# Patient Record
Sex: FEMALE | Race: WHITE | NOT HISPANIC OR LATINO | Employment: OTHER | URBAN - NONMETROPOLITAN AREA
[De-identification: names, ages, dates, MRNs, and addresses within clinical notes are randomized per-mention and may not be internally consistent; named-entity substitution may affect disease eponyms.]

---

## 2020-05-12 ENCOUNTER — OFFICE VISIT (OUTPATIENT)
Dept: CARDIOLOGY | Facility: CLINIC | Age: 65
End: 2020-05-12

## 2020-05-12 VITALS
WEIGHT: 180 LBS | DIASTOLIC BLOOD PRESSURE: 78 MMHG | SYSTOLIC BLOOD PRESSURE: 142 MMHG | HEIGHT: 65 IN | OXYGEN SATURATION: 93 % | HEART RATE: 118 BPM | TEMPERATURE: 98.6 F | BODY MASS INDEX: 29.99 KG/M2

## 2020-05-12 DIAGNOSIS — R60.0 EDEMA LEG: Primary | ICD-10-CM

## 2020-05-12 DIAGNOSIS — R06.02 SHORTNESS OF BREATH: ICD-10-CM

## 2020-05-12 DIAGNOSIS — R00.2 PALPITATIONS: ICD-10-CM

## 2020-05-12 PROCEDURE — 99204 OFFICE O/P NEW MOD 45 MIN: CPT | Performed by: PHYSICIAN ASSISTANT

## 2020-05-12 PROCEDURE — 93000 ELECTROCARDIOGRAM COMPLETE: CPT | Performed by: PHYSICIAN ASSISTANT

## 2020-05-12 RX ORDER — QUETIAPINE FUMARATE 100 MG/1
100 TABLET, FILM COATED ORAL
COMMUNITY
Start: 2020-04-30

## 2020-05-12 RX ORDER — FUROSEMIDE 40 MG/1
40 TABLET ORAL 2 TIMES DAILY
Qty: 60 TABLET | Refills: 5 | Status: SHIPPED | OUTPATIENT
Start: 2020-05-12

## 2020-05-12 RX ORDER — POTASSIUM CHLORIDE 750 MG/1
10 TABLET, FILM COATED, EXTENDED RELEASE ORAL 2 TIMES DAILY
Qty: 60 TABLET | Refills: 11 | Status: SHIPPED | OUTPATIENT
Start: 2020-05-12

## 2020-05-12 RX ORDER — POTASSIUM CHLORIDE 750 MG/1
10 TABLET, FILM COATED, EXTENDED RELEASE ORAL DAILY
Qty: 30 TABLET | Refills: 11 | Status: SHIPPED | OUTPATIENT
Start: 2020-05-12 | End: 2020-05-12 | Stop reason: SDUPTHER

## 2020-05-12 RX ORDER — VALSARTAN 160 MG/1
160 TABLET ORAL 2 TIMES DAILY
Qty: 60 TABLET | Refills: 11 | Status: SHIPPED | OUTPATIENT
Start: 2020-05-12

## 2020-05-12 RX ORDER — AMLODIPINE BESYLATE 5 MG/1
5 TABLET ORAL DAILY
COMMUNITY
Start: 2020-04-22 | End: 2020-05-12

## 2020-05-12 RX ORDER — ALBUTEROL SULFATE 90 UG/1
1 AEROSOL, METERED RESPIRATORY (INHALATION) AS NEEDED
COMMUNITY
Start: 2020-04-25

## 2020-05-12 RX ORDER — GABAPENTIN 800 MG/1
800 TABLET ORAL 2 TIMES DAILY
COMMUNITY
Start: 2020-05-07

## 2020-05-12 RX ORDER — BUPRENORPHINE HYDROCHLORIDE AND NALOXONE HYDROCHLORIDE DIHYDRATE 8; 2 MG/1; MG/1
1 TABLET SUBLINGUAL 2 TIMES DAILY
COMMUNITY
Start: 2020-05-11

## 2020-05-12 RX ORDER — SERTRALINE HYDROCHLORIDE 100 MG/1
100 TABLET, FILM COATED ORAL DAILY
COMMUNITY
Start: 2020-05-05

## 2020-05-12 RX ORDER — FUROSEMIDE 20 MG/1
20 TABLET ORAL DAILY
COMMUNITY
Start: 2020-05-03 | End: 2020-05-12 | Stop reason: SDUPTHER

## 2020-05-12 NOTE — PROGRESS NOTES
Subjective   Radha Diaz is a 64 y.o. female     Chief Complaint   Patient presents with   • Edema     x 4 months referred by Luz GALLARDO    Patient is a 64-year-old female that presents to the office for evaluation.  Patient has no history of coronary artery disease.    Patient has had lower extremity edema for quite some time.  She describes that she recently began noticing it is worsening bilaterally.  She had significant lower extremity edema and was prescribed Lasix by her primary.  This was increased.  Patient in fact had been taking 80 mg of Lasix.  She describes taking 4 this morning and is having to urinate frequently now.  She is not sure what is caused her bilateral lower extremity edema but is concerned that it has steadily progressed.    She does not have any chest discomfort but does have fatigue And dyspnea.  She has chronic lung disease and is on oxygen continuously with inhalers.  She does not describe PND orthopnea.    She does not palpitate or have dysrhythmic symptoms.  She otherwise is doing well    Current Outpatient Medications   Medication Sig Dispense Refill   • albuterol sulfate  (90 Base) MCG/ACT inhaler Inhale 1 puff As Needed.     • buprenorphine-naloxone (SUBOXONE) 8-2 MG per SL tablet 1 tablet 2 (Two) Times a Day.     • furosemide (LASIX) 40 MG tablet Take 1 tablet by mouth 2 (Two) Times a Day. 60 tablet 5   • gabapentin (NEURONTIN) 800 MG tablet Take 800 mg by mouth 2 (Two) Times a Day.     • O2 (OXYGEN) Inhale 4 L/min 1 (One) Time.     • QUEtiapine (SEROquel) 100 MG tablet Take 100 mg by mouth every night at bedtime.     • sertraline (ZOLOFT) 100 MG tablet Take 100 mg by mouth Daily.     • TRELEGY ELLIPTA 100-62.5-25 MCG/INH aerosol powder  1 puff Daily.     • BREO ELLIPTA 100-25 MCG/INH inhaler      • potassium chloride (K-DUR) 10 MEQ CR tablet Take 1 tablet by mouth 2 (Two) Times a Day. 60 tablet 11   • valsartan (DIOVAN) 160 MG tablet Take 1 tablet by  mouth 2 (Two) Times a Day. 60 tablet 11     No current facility-administered medications for this visit.        Sulfa antibiotics    Past Medical History:   Diagnosis Date   • COPD (chronic obstructive pulmonary disease) (CMS/[facility])    • Depression    • Hypertension        Social History     Socioeconomic History   • Marital status: Legally      Spouse name: Not on file   • Number of children: Not on file   • Years of education: Not on file   • Highest education level: Not on file   Tobacco Use   • Smoking status: Current Every Day Smoker     Packs/day: 0.50     Years: 40.00     Pack years: 20.00     Types: Cigarettes   • Smokeless tobacco: Never Used   Substance and Sexual Activity   • Alcohol use: Not Currently   • Drug use: Yes     Comment: Perocet   • Sexual activity: Defer       Family History   Problem Relation Age of Onset   • No Known Problems Mother    • Heart disease Father    • Coronary artery disease Father    • Heart disease Sister    • Coronary artery disease Sister        Review of Systems   Constitutional: Positive for fatigue. Negative for chills and fever.   HENT: Positive for congestion. Negative for rhinorrhea and sinus pressure.    Eyes: Positive for visual disturbance (glasses).   Respiratory: Positive for shortness of breath (COPD, uses oxygen). Negative for chest tightness.    Cardiovascular: Positive for leg swelling. Negative for chest pain and palpitations (at times).   Gastrointestinal: Negative.  Negative for abdominal pain, blood in stool, constipation, diarrhea, nausea and vomiting.   Endocrine: Negative.  Negative for cold intolerance and heat intolerance.   Genitourinary: Positive for urgency. Negative for dysuria, frequency and hematuria.   Musculoskeletal: Positive for back pain (chronic). Negative for arthralgias and neck pain.   Skin: Negative.  Negative for rash and wound.   Allergic/Immunologic: Negative.  Negative for environmental allergies and food allergies.    "  Neurological: Negative.  Negative for dizziness, syncope and light-headedness.   Hematological: Does not bruise/bleed easily.   Psychiatric/Behavioral: Positive for sleep disturbance (oxygen at hs, denies waking with soa or cp). Negative for agitation. The patient is not nervous/anxious.    All other systems reviewed and are negative.      Objective   Vitals:    05/12/20 0955   BP: 142/78   Pulse: 118   Temp: 98.6 °F (37 °C)   SpO2: 93%   Weight: 81.6 kg (180 lb)   Height: 165.1 cm (65\")      /78   Pulse 118   Temp 98.6 °F (37 °C)   Ht 165.1 cm (65\")   Wt 81.6 kg (180 lb)   SpO2 93% Comment: O2@4LPM pulsate  BMI 29.95 kg/m²     Lab Results (most recent)     None          Physical Exam   Constitutional: She is oriented to person, place, and time. She appears well-developed and well-nourished. No distress.   HENT:   Head: Normocephalic and atraumatic.   Eyes: Conjunctivae are normal. Right eye exhibits no discharge. Left eye exhibits no discharge. No scleral icterus.   Neck: No JVD present.   Cardiovascular: Normal rate, regular rhythm and normal heart sounds. Exam reveals no gallop and no friction rub.   No murmur heard.  Pulmonary/Chest: Effort normal and breath sounds normal. No respiratory distress. She has no wheezes. She has no rales. She exhibits no tenderness.   Musculoskeletal: Normal range of motion. She exhibits edema.   Neurological: She is alert and oriented to person, place, and time. No cranial nerve deficit.   Skin: Skin is warm and dry. No rash noted. No erythema. No pallor.   Psychiatric: She has a normal mood and affect. Her behavior is normal.   Nursing note and vitals reviewed.      Procedure     ECG 12 Lead  Date/Time: 5/12/2020 10:05 AM  Performed by: Pepito James PA  Authorized by: Pepito James PA   Comparison: not compared with previous ECG   Comments: EKG demonstrates sinus rhythm at 95 bpm with no acute ST changes                 Assessment/Plan     Problems " Addressed this Visit        Cardiovascular and Mediastinum    Palpitations    Relevant Orders    ECG 12 Lead    Adult Transthoracic Echo Complete W/ Cont if Necessary Per Protocol    Stress Test With Myocardial Perfusion One Day       Respiratory    Shortness of breath    Relevant Orders    Stress Test With Myocardial Perfusion One Day       Other    Edema leg - Primary    Relevant Orders    ECG 12 Lead    Adult Transthoracic Echo Complete W/ Cont if Necessary Per Protocol    Stress Test With Myocardial Perfusion One Day            Recommendation  1.  Patient with shortness of breath, lower extremity edema and a degree of fatigue and weakness.  I am concerned that she is requiring such high doses of Lasix to treat her lower extremity edema.  I do think cardiac evaluation is warranted    2.  Echocardiogram to evaluate LV structure, LV function, assess diastolic performance and rule out valvular disease    3.  She is taking Lasix 80 mg.  She had labs performed by primary which I like to review.  I am adding a small potassium supplement.  I would like to recheck laboratories  4.  I am stopping amlodipine.  Office today with peripheral vasodilatation, this may contribute some to her edema.  I would rather to switch that to valsartan which can hopefully help with blood pressure control and we can increase it to twice daily dosing if needed    5.  I like to see her back for follow-up after testing in a few weeks.  She will follow with primary as scheduled         Radha Diaz  reports that she has been smoking cigarettes. She has a 20.00 pack-year smoking history. She has never used smokeless tobacco.. I have educated her on the risk of diseases from using tobacco products such as cancer, COPD and heart diease.     I advised her to quit and she is willing to quit. We have discussed the following method/s for tobacco cessation:  Counseling.    I spent 3  minutes counseling the patient.          Patient's Body mass index  is 29.95 kg/m². BMI is above normal parameters. Recommendations include: educational material.         Electronically signed by:

## 2020-05-20 ENCOUNTER — TELEPHONE (OUTPATIENT)
Dept: CARDIOLOGY | Facility: CLINIC | Age: 65
End: 2020-05-20

## 2020-05-20 DIAGNOSIS — R06.02 SHORTNESS OF BREATH: ICD-10-CM

## 2020-05-20 DIAGNOSIS — R00.2 PALPITATIONS: Primary | ICD-10-CM

## 2020-05-20 DIAGNOSIS — R60.0 EDEMA LEG: ICD-10-CM

## 2020-06-03 ENCOUNTER — APPOINTMENT (OUTPATIENT)
Dept: CARDIOLOGY | Facility: HOSPITAL | Age: 65
End: 2020-06-03

## 2020-06-03 ENCOUNTER — LAB (OUTPATIENT)
Dept: LAB | Facility: HOSPITAL | Age: 65
End: 2020-06-03

## 2020-06-03 ENCOUNTER — HOSPITAL ENCOUNTER (OUTPATIENT)
Dept: CARDIOLOGY | Facility: HOSPITAL | Age: 65
End: 2020-06-03

## 2020-06-03 ENCOUNTER — HOSPITAL ENCOUNTER (OUTPATIENT)
Dept: CARDIOLOGY | Facility: HOSPITAL | Age: 65
Discharge: HOME | End: 2020-06-03
Admitting: PHYSICIAN ASSISTANT

## 2020-06-03 VITALS — HEIGHT: 65 IN | WEIGHT: 179.9 LBS | BODY MASS INDEX: 29.97 KG/M2

## 2020-06-03 DIAGNOSIS — R60.0 EDEMA LEG: ICD-10-CM

## 2020-06-03 DIAGNOSIS — R00.2 PALPITATIONS: ICD-10-CM

## 2020-06-03 DIAGNOSIS — R06.02 SHORTNESS OF BREATH: ICD-10-CM

## 2020-06-03 LAB
ANION GAP SERPL CALCULATED.3IONS-SCNC: 10.7 MMOL/L (ref 5–15)
BUN BLD-MCNC: 8 MG/DL (ref 8–23)
BUN/CREAT SERPL: 10.7 (ref 7–25)
CALCIUM SPEC-SCNC: 9.4 MG/DL (ref 8.6–10.5)
CHLORIDE SERPL-SCNC: 100 MMOL/L (ref 98–107)
CO2 SERPL-SCNC: 29.3 MMOL/L (ref 22–29)
CREAT BLD-MCNC: 0.75 MG/DL (ref 0.57–1)
GFR SERPL CREATININE-BSD FRML MDRD: 78 ML/MIN/1.73
GLUCOSE BLD-MCNC: 115 MG/DL (ref 65–99)
POTASSIUM BLD-SCNC: 3.7 MMOL/L (ref 3.5–5.2)
SODIUM BLD-SCNC: 140 MMOL/L (ref 136–145)

## 2020-06-03 PROCEDURE — 80048 BASIC METABOLIC PNL TOTAL CA: CPT | Performed by: PHYSICIAN ASSISTANT

## 2020-06-03 PROCEDURE — 93306 TTE W/DOPPLER COMPLETE: CPT

## 2020-06-03 PROCEDURE — 93306 TTE W/DOPPLER COMPLETE: CPT | Performed by: INTERNAL MEDICINE

## 2020-06-03 PROCEDURE — 36415 COLL VENOUS BLD VENIPUNCTURE: CPT

## 2020-06-10 ENCOUNTER — HOSPITAL ENCOUNTER (OUTPATIENT)
Dept: CARDIOLOGY | Facility: HOSPITAL | Age: 65
Discharge: HOME | End: 2020-06-10

## 2020-06-10 DIAGNOSIS — R00.2 PALPITATIONS: ICD-10-CM

## 2020-06-10 DIAGNOSIS — R60.0 EDEMA LEG: ICD-10-CM

## 2020-06-10 DIAGNOSIS — R06.02 SHORTNESS OF BREATH: ICD-10-CM

## 2020-06-10 LAB
AORTIC DIMENSIONLESS INDEX: 0.9 (DI)
BH CV ECHO MEAS - ACS: 1.5 CM
BH CV ECHO MEAS - AO MAX PG (FULL): 2.2 MMHG
BH CV ECHO MEAS - AO MAX PG: 8.1 MMHG
BH CV ECHO MEAS - AO MEAN PG (FULL): 2 MMHG
BH CV ECHO MEAS - AO MEAN PG: 4 MMHG
BH CV ECHO MEAS - AO ROOT AREA (BSA CORRECTED): 1.7
BH CV ECHO MEAS - AO ROOT AREA: 8.6 CM^2
BH CV ECHO MEAS - AO ROOT DIAM: 3.3 CM
BH CV ECHO MEAS - AO V2 MAX: 142 CM/SEC
BH CV ECHO MEAS - AO V2 MEAN: 88.8 CM/SEC
BH CV ECHO MEAS - AO V2 VTI: 30.9 CM
BH CV ECHO MEAS - BSA(HAYCOCK): 2 M^2
BH CV ECHO MEAS - BSA: 1.9 M^2
BH CV ECHO MEAS - BZI_BMI: 30 KILOGRAMS/M^2
BH CV ECHO MEAS - BZI_METRIC_HEIGHT: 165.1 CM
BH CV ECHO MEAS - BZI_METRIC_WEIGHT: 81.6 KG
BH CV ECHO MEAS - EDV(CUBED): 111.3 ML
BH CV ECHO MEAS - EDV(TEICH): 108 ML
BH CV ECHO MEAS - EF(CUBED): 68.6 %
BH CV ECHO MEAS - EF(TEICH): 60 %
BH CV ECHO MEAS - ESV(CUBED): 35 ML
BH CV ECHO MEAS - ESV(TEICH): 43.2 ML
BH CV ECHO MEAS - FS: 32 %
BH CV ECHO MEAS - IVS/LVPW: 1.1
BH CV ECHO MEAS - IVSD: 1.2 CM
BH CV ECHO MEAS - LA DIMENSION: 3.8 CM
BH CV ECHO MEAS - LA/AO: 1.2
BH CV ECHO MEAS - LAT PEAK E' VEL: 14.1 CM/SEC
BH CV ECHO MEAS - LV IVRT: 0.1 SEC
BH CV ECHO MEAS - LV MASS(C)D: 198.3 GRAMS
BH CV ECHO MEAS - LV MASS(C)DI: 104.8 GRAMS/M^2
BH CV ECHO MEAS - LV MAX PG: 5.9 MMHG
BH CV ECHO MEAS - LV MEAN PG: 2 MMHG
BH CV ECHO MEAS - LV V1 MAX: 121 CM/SEC
BH CV ECHO MEAS - LV V1 MEAN: 71.8 CM/SEC
BH CV ECHO MEAS - LV V1 VTI: 28.3 CM
BH CV ECHO MEAS - LVIDD: 4.8 CM
BH CV ECHO MEAS - LVIDS: 3.3 CM
BH CV ECHO MEAS - LVPWD: 1.1 CM
BH CV ECHO MEAS - MED PEAK E' VEL: 10.7 CM/SEC
BH CV ECHO MEAS - MV A MAX VEL: 55.8 CM/SEC
BH CV ECHO MEAS - MV DEC SLOPE: 328 CM/SEC^2
BH CV ECHO MEAS - MV DEC TIME: 0.14 SEC
BH CV ECHO MEAS - MV E MAX VEL: 74.4 CM/SEC
BH CV ECHO MEAS - MV E/A: 1.3
BH CV ECHO MEAS - MV MAX PG: 2.9 MMHG
BH CV ECHO MEAS - MV MEAN PG: 1 MMHG
BH CV ECHO MEAS - MV P1/2T MAX VEL: 87.9 CM/SEC
BH CV ECHO MEAS - MV P1/2T: 78.5 MSEC
BH CV ECHO MEAS - MV V2 MAX: 84.6 CM/SEC
BH CV ECHO MEAS - MV V2 MEAN: 48.4 CM/SEC
BH CV ECHO MEAS - MV V2 VTI: 24.9 CM
BH CV ECHO MEAS - MVA P1/2T LCG: 2.5 CM^2
BH CV ECHO MEAS - MVA(P1/2T): 2.8 CM^2
BH CV ECHO MEAS - PA MAX PG (FULL): 2.5 MMHG
BH CV ECHO MEAS - PA MAX PG: 4.7 MMHG
BH CV ECHO MEAS - PA MEAN PG (FULL): 2 MMHG
BH CV ECHO MEAS - PA MEAN PG: 3 MMHG
BH CV ECHO MEAS - PA V2 MAX: 108 CM/SEC
BH CV ECHO MEAS - PA V2 MEAN: 76.5 CM/SEC
BH CV ECHO MEAS - PA V2 VTI: 21.1 CM
BH CV ECHO MEAS - RAP SYSTOLE: 10 MMHG
BH CV ECHO MEAS - RV MAX PG: 2.2 MMHG
BH CV ECHO MEAS - RV MEAN PG: 1 MMHG
BH CV ECHO MEAS - RV V1 MAX: 73.8 CM/SEC
BH CV ECHO MEAS - RV V1 MEAN: 45.5 CM/SEC
BH CV ECHO MEAS - RV V1 VTI: 17.1 CM
BH CV ECHO MEAS - RVDD: 3.5 CM
BH CV ECHO MEAS - RVSP: 25 MMHG
BH CV ECHO MEAS - SI(AO): 139.7 ML/M^2
BH CV ECHO MEAS - SI(CUBED): 40.3 ML/M^2
BH CV ECHO MEAS - SI(TEICH): 34.3 ML/M^2
BH CV ECHO MEAS - SV(AO): 264.3 ML
BH CV ECHO MEAS - SV(CUBED): 76.3 ML
BH CV ECHO MEAS - SV(TEICH): 64.9 ML
BH CV ECHO MEAS - TR MAX VEL: 193 CM/SEC
BH CV ECHO MEASUREMENTS AVERAGE E/E' RATIO: 6
MAXIMAL PREDICTED HEART RATE: 157 BPM
STRESS TARGET HR: 133 BPM

## 2020-06-10 PROCEDURE — 93017 CV STRESS TEST TRACING ONLY: CPT

## 2020-06-10 PROCEDURE — 25010000002 REGADENOSON 0.4 MG/5ML SOLUTION: Performed by: INTERNAL MEDICINE

## 2020-06-10 PROCEDURE — 93016 CV STRESS TEST SUPVJ ONLY: CPT | Performed by: NURSE PRACTITIONER

## 2020-06-10 PROCEDURE — A9500 TC99M SESTAMIBI: HCPCS | Performed by: INTERNAL MEDICINE

## 2020-06-10 PROCEDURE — 0 TECHNETIUM SESTAMIBI: Performed by: INTERNAL MEDICINE

## 2020-06-10 PROCEDURE — 93018 CV STRESS TEST I&R ONLY: CPT | Performed by: INTERNAL MEDICINE

## 2020-06-10 PROCEDURE — 78452 HT MUSCLE IMAGE SPECT MULT: CPT

## 2020-06-10 PROCEDURE — 78452 HT MUSCLE IMAGE SPECT MULT: CPT | Performed by: INTERNAL MEDICINE

## 2020-06-10 RX ADMIN — REGADENOSON 0.4 MG: 0.08 INJECTION, SOLUTION INTRAVENOUS at 11:30

## 2020-06-10 RX ADMIN — TECHNETIUM TC 99M SESTAMIBI 1 DOSE: 1 INJECTION INTRAVENOUS at 09:58

## 2020-06-10 RX ADMIN — TECHNETIUM TC 99M SESTAMIBI 1 DOSE: 1 INJECTION INTRAVENOUS at 11:30

## 2020-06-11 ENCOUNTER — TELEPHONE (OUTPATIENT)
Dept: CARDIOLOGY | Facility: CLINIC | Age: 65
End: 2020-06-11

## 2020-06-11 NOTE — TELEPHONE ENCOUNTER
Patient aware of echo results. Stress test done yesterday, will call when results are available. Andree Zavala MA      ----- Message from DEMETRIO Quiros sent at 6/10/2020  2:23 PM EDT -----  Routine follow-up      Result Text     Severely technically limited study.  Only gross generalizations can be rendered.     1.  LV size, function, and wall motion are grossly preserved.  LV wall thickness appears at the upper limits of normal but not significantly increased.  Surprisingly, LV diastolic function and filling pressures are normal.  At the atria not dilated.  The right ventricle is not well visualized but appears grossly normal.     2.  Valves are morphologically and physiologically normal with no stenotic lesions or valve associated masses or thrombi.  There is trivial to mild mitral and trivial tricuspid regurgitation.     3.  No pericardial or great vessel pathology.     4.  Normal pulmonary pressures.

## 2020-06-16 LAB
BH CV NUCLEAR PRIOR STUDY: 2
BH CV STRESS BP STAGE 1: NORMAL
BH CV STRESS COMMENTS STAGE 1: NORMAL
BH CV STRESS DOSE REGADENOSON STAGE 1: 0.4
BH CV STRESS DURATION MIN STAGE 1: 0
BH CV STRESS DURATION SEC STAGE 1: 10
BH CV STRESS HR STAGE 1: 86
BH CV STRESS PROTOCOL 1: NORMAL
BH CV STRESS RECOVERY BP: NORMAL MMHG
BH CV STRESS RECOVERY HR: 75 BPM
BH CV STRESS STAGE 1: 1
MAXIMAL PREDICTED HEART RATE: 157 BPM
PERCENT MAX PREDICTED HR: 54.78 %
STRESS BASELINE BP: NORMAL MMHG
STRESS BASELINE HR: 63 BPM
STRESS PERCENT HR: 64 %
STRESS POST PEAK BP: NORMAL MMHG
STRESS POST PEAK HR: 86 BPM
STRESS TARGET HR: 133 BPM

## 2020-06-17 ENCOUNTER — TELEPHONE (OUTPATIENT)
Dept: CARDIOLOGY | Facility: CLINIC | Age: 65
End: 2020-06-17

## 2020-06-17 NOTE — TELEPHONE ENCOUNTER
Patient aware of stress test results. She will follow up as scheduled. Andree Zavala MA      ----- Message from DEMETRIO Quiros sent at 6/17/2020 12:05 PM EDT -----  Routine follow-up      Result Text     1.  No scintigraphic evidence of ischemia.     2.  Preserved post-rest ejection fraction of 71% with no focal wall motion abnormalities.     3.  No evidence of pharmacologically induced transient ischemic dilation.  Borderline increased lung heart radiopharmaceutical ratio of 0.42.

## 2024-06-09 ENCOUNTER — OUTSIDE FACILITY SERVICE (OUTPATIENT)
Dept: CARDIOLOGY | Facility: CLINIC | Age: 69
End: 2024-06-09
Payer: MEDICARE